# Patient Record
Sex: FEMALE | Race: OTHER | NOT HISPANIC OR LATINO | Employment: OTHER | URBAN - METROPOLITAN AREA
[De-identification: names, ages, dates, MRNs, and addresses within clinical notes are randomized per-mention and may not be internally consistent; named-entity substitution may affect disease eponyms.]

---

## 2022-09-28 ENCOUNTER — OFFICE VISIT (OUTPATIENT)
Dept: OTOLARYNGOLOGY | Facility: CLINIC | Age: 67
End: 2022-09-28
Payer: MEDICARE

## 2022-09-28 DIAGNOSIS — J38.5 LARYNGEAL SPASM: Primary | ICD-10-CM

## 2022-09-28 DIAGNOSIS — J38.3 ADDUCTOR SPASMODIC DYSPHONIA: ICD-10-CM

## 2022-09-28 DIAGNOSIS — R49.0 DYSPHONIA: Primary | ICD-10-CM

## 2022-09-28 DIAGNOSIS — J38.5 LARYNGEAL SPASM: ICD-10-CM

## 2022-09-28 PROCEDURE — 1159F PR MEDICATION LIST DOCUMENTED IN MEDICAL RECORD: ICD-10-PCS | Mod: CPTII,S$GLB,, | Performed by: OTOLARYNGOLOGY

## 2022-09-28 PROCEDURE — 1160F PR REVIEW ALL MEDS BY PRESCRIBER/CLIN PHARMACIST DOCUMENTED: ICD-10-PCS | Mod: CPTII,S$GLB,, | Performed by: OTOLARYNGOLOGY

## 2022-09-28 PROCEDURE — 99204 OFFICE O/P NEW MOD 45 MIN: CPT | Mod: 25,S$GLB,, | Performed by: OTOLARYNGOLOGY

## 2022-09-28 PROCEDURE — 99204 PR OFFICE/OUTPT VISIT, NEW, LEVL IV, 45-59 MIN: ICD-10-PCS | Mod: 25,S$GLB,, | Performed by: OTOLARYNGOLOGY

## 2022-09-28 PROCEDURE — 3288F PR FALLS RISK ASSESSMENT DOCUMENTED: ICD-10-PCS | Mod: CPTII,S$GLB,, | Performed by: OTOLARYNGOLOGY

## 2022-09-28 PROCEDURE — 3288F FALL RISK ASSESSMENT DOCD: CPT | Mod: CPTII,S$GLB,, | Performed by: OTOLARYNGOLOGY

## 2022-09-28 PROCEDURE — 1101F PR PT FALLS ASSESS DOC 0-1 FALLS W/OUT INJ PAST YR: ICD-10-PCS | Mod: CPTII,S$GLB,, | Performed by: OTOLARYNGOLOGY

## 2022-09-28 PROCEDURE — 99999 PR PBB SHADOW E&M-NEW PATIENT-LVL III: CPT | Mod: PBBFAC,,, | Performed by: OTOLARYNGOLOGY

## 2022-09-28 PROCEDURE — 1160F RVW MEDS BY RX/DR IN RCRD: CPT | Mod: CPTII,S$GLB,, | Performed by: OTOLARYNGOLOGY

## 2022-09-28 PROCEDURE — 1101F PT FALLS ASSESS-DOCD LE1/YR: CPT | Mod: CPTII,S$GLB,, | Performed by: OTOLARYNGOLOGY

## 2022-09-28 PROCEDURE — 99999 PR PBB SHADOW E&M-NEW PATIENT-LVL III: ICD-10-PCS | Mod: PBBFAC,,, | Performed by: OTOLARYNGOLOGY

## 2022-09-28 PROCEDURE — 31579 PR LARYNGOSCOPY, FLEX/RIGID TELESCOPIC, W/STROBOSCOPY: ICD-10-PCS | Mod: S$GLB,,, | Performed by: OTOLARYNGOLOGY

## 2022-09-28 PROCEDURE — 31579 LARYNGOSCOPY TELESCOPIC: CPT | Mod: S$GLB,,, | Performed by: OTOLARYNGOLOGY

## 2022-09-28 PROCEDURE — 1159F MED LIST DOCD IN RCRD: CPT | Mod: CPTII,S$GLB,, | Performed by: OTOLARYNGOLOGY

## 2022-09-28 RX ORDER — TRAMADOL HYDROCHLORIDE 50 MG/1
50 TABLET ORAL
COMMUNITY

## 2022-09-28 RX ORDER — TRAZODONE HYDROCHLORIDE 150 MG/1
150 TABLET ORAL
COMMUNITY

## 2022-09-28 RX ORDER — PAROXETINE HYDROCHLORIDE 20 MG/1
TABLET, FILM COATED ORAL
COMMUNITY

## 2022-09-28 RX ORDER — LEVOTHYROXINE SODIUM 50 UG/1
100 TABLET ORAL
COMMUNITY
Start: 2021-11-08

## 2022-09-28 RX ORDER — CHOLECALCIFEROL (VITAMIN D3) 125 MCG
1 CAPSULE ORAL
COMMUNITY

## 2022-09-28 RX ORDER — LIOTHYRONINE SODIUM 5 UG/1
TABLET ORAL
COMMUNITY

## 2022-09-28 RX ORDER — BUSPIRONE HYDROCHLORIDE 15 MG/1
15 TABLET ORAL 2 TIMES DAILY
COMMUNITY
Start: 2022-09-18

## 2022-09-28 RX ORDER — MONTELUKAST SODIUM 10 MG/1
TABLET ORAL
COMMUNITY

## 2022-09-28 RX ORDER — ALBUTEROL SULFATE 90 UG/1
1-2 AEROSOL, METERED RESPIRATORY (INHALATION)
COMMUNITY
Start: 2021-11-11

## 2022-09-28 RX ORDER — OMEPRAZOLE 40 MG/1
40 CAPSULE, DELAYED RELEASE ORAL DAILY
COMMUNITY
Start: 2022-09-18

## 2022-09-28 RX ORDER — OXYBUTYNIN CHLORIDE 5 MG/1
TABLET, EXTENDED RELEASE ORAL
COMMUNITY

## 2022-09-28 RX ORDER — LEVOCETIRIZINE DIHYDROCHLORIDE 5 MG/1
TABLET, FILM COATED ORAL
COMMUNITY

## 2022-09-28 RX ORDER — ALPRAZOLAM 0.5 MG/1
TABLET, ORALLY DISINTEGRATING ORAL
COMMUNITY

## 2022-09-28 RX ORDER — IBUPROFEN 800 MG/1
800 TABLET ORAL EVERY 8 HOURS PRN
COMMUNITY
Start: 2022-07-14

## 2022-09-28 RX ORDER — BUPROPION HYDROCHLORIDE 300 MG/1
300 TABLET ORAL DAILY
COMMUNITY
Start: 2022-09-18

## 2022-09-28 RX ORDER — CELECOXIB 200 MG/1
CAPSULE ORAL
COMMUNITY

## 2022-09-28 RX ORDER — CLONAZEPAM 0.5 MG/1
1-1.5 TABLET ORAL
COMMUNITY

## 2022-09-28 NOTE — LETTER
September 28, 2022        Yrn Beard MD  [address] [place] [address] Drive  [facility] 101  [facility] [address]             [facility]  [address], [address] FLOOR  [facility] [address]  Phone: [phone]  Fax: [phone]   Patient: Bouchra Ro   MR Number: [id number]   YOB: 1955   Date of Visit: 9/28/2022       Dear Dr. Beard:    Thank you for referring Bouchra Ro to me for evaluation. Below are the relevant portions of my assessment and plan of care.            If you have questions, please do not hesitate to call me. I look forward to following Bouchra along with you.    Sincerely,      Finn Lopes MD           CC  Michelle Ross MD

## 2022-09-28 NOTE — LETTER
September 28, 2022        Yrn Beard MD  [address] [place] [address] Drive  [facility] 101  [facility] [address]             [facility]  [address], [address] FLOOR  [facility] [address]  Phone: [phone]  Fax: [phone]   Patient: Bouchra Ro   MR Number: [id number]   YOB: 1955   Date of Visit: 9/28/2022       Dear Dr. Beard:    Thank you for referring Bouchra Ro to me for evaluation. Below are the relevant portions of my assessment and plan of care.            If you have questions, please do not hesitate to call me. I look forward to following Bouchra along with you.    Sincerely,      Finn Lopes MD           CC  Michelle Ross MD

## 2022-09-28 NOTE — LETTER
September 28, 2022        Yrn Beard MD  [address] [place] [address] Drive  [facility] 101  [facility] [address]             [facility]  [address], [address] FLOOR  [facility] [address]  Phone: [phone]  Fax: [phone]   Patient: Bouchra Ro   MR Number: [id number]   YOB: 1955   Date of Visit: 9/28/2022       Dear Dr. Beard:    Thank you for referring Bouchra Ro to me for evaluation. Below are the relevant portions of my assessment and plan of care.            If you have questions, please do not hesitate to call me. I look forward to following Bouchra along with you.    Sincerely,      Finn Lopes MD           CC  Michelle Ross MD

## 2022-09-28 NOTE — PROGRESS NOTES
[facility]  Department of Otorhinolaryngology and Communication Sciences    Bouchra Ro is a 67 y.o. female who presents to the [facility] Center for consultation at the kind request of Dr. Yrn Beard for further management of hoarseness.     She complains of hoarseness. Says she has always been susceptible to laryngitis (ball games, allergies). Had long term COVID in 2021. Hoarse and/off then. Now has been densely hoarse since then. Has to hold her breath to talk. Vocal effort. Onset was gradual. Duration is several months. Time course is constant. Symptoms are stable. She denies any exacerbating factors. She denies any alleviating factors. Associated symptoms include Sore throat/dry throat.     She lives by herself. 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 Lost her  about 7 years ago.    Denies any phonemic preponderance of symptoms. Since COVID she has has been experiencing tremors/spasms involving her hands/fingers/fine motor. Her left hand has a finger that locks up at times. She saw a neurologist Dr. Mickey Chiang in [place] regarding long COVID symptoms, but she has not been investigated for dystonia or spasticity.     Voice Handicap Index Total Score  9/21/2022   Voice Handicap Index Total Score 35     Reflux Symptoms Index Total Score 9/21/2022   Reflux Symptoms Index Total Score 38       Past Medical History  Hyperlipidemia  Depression/anxiety  Hypothyroidism    Past Surgical History  TMJ surgery  Hysterectomy  Knee replacements    Family History  Her family history is not on file.    Social History  She is a nonsmoker    Allergies  She is allergic to codeine, meperidine, penicillins, and morphine.    Medications  She has a current medication list which includes the following prescription(s): albuterol, levothyroxine, alprazolam odt, bupropion, buspirone, celecoxib, clonazepam, ibuprofen, lactase, levocetirizine, liothyronine, montelukast, omeprazole, oxybutynin,  paroxetine, tramadol, and trazodone.    Review of Systems   Constitutional:  Negative for fever.   HENT:  Positive for hearing loss, nosebleeds, sinus pressure, trouble swallowing and voice change. Negative for sore throat.    Eyes:  Positive for itching. Negative for visual disturbance.   Respiratory:  Positive for cough and shortness of breath. Negative for wheezing.    Cardiovascular: Negative.  Negative for chest pain.   Gastrointestinal:  Positive for diarrhea. Negative for nausea.   Endocrine: Negative.    Genitourinary: Negative.    Musculoskeletal:  Negative for arthralgias.   Skin: Negative.  Negative for rash.   Neurological:  Positive for headaches. Negative for tremors.   Hematological: Negative.  Does not bruise/bleed easily.   Psychiatric/Behavioral:  The patient is nervous/anxious.         Objective:     VS reviewed   Physical Exam  Constitutional: comfortable, well dressed  Psychiatric: appropriate affect  Respiratory: comfortably breathing, symmetric chest rise, no stridor  Voice: dense breathy strain/whisper; inconsistent phonatory breaks primarily on voiced phonemes  Cardiovascular: upper extremities non-edematous  Lymphatic: no cervical lymphadenopathy  Neurologic: alert and oriented to time, place, person, and situation; cranial nerves 3-12 grossly intact  Head: normocephalic  Eyes: conjunctivae and sclerae clear  Ears: normal pinnae, normal external auditory canals, tympanic membranes intact  Nose: mucosa pink and noncongested, no masses, no mucopurulence, no polyps  Oral cavity / oropharynx: no mucosal lesions  Neck: soft, full range of motion, laryngotracheal complex palpable with appropriate landmarks, larynx elevates on swallowing  Indirect laryngoscopy: limited due to gag    Procedure  Flexible Laryngeal Videostroboscopy (03794): Laryngeal videostroboscopy is indicated to assess laryngeal vibratory biomechanics and vocal fold oscillation, which cannot be assessed with a plain light  examination. This was carried out transnasally with a distal chip videoendoscope. After verbal consent was obtained, the patient was positioned and the nose was topically decongested with 1% phenylephrine and topically anesthetized with 4% lidocaine. The endoscope was passed through the most patent nasal cavity and positioned to image the nasopharynx, larynx, and hypopharynx in detail. The following features were examined: nasopharyngeal, laryngeal, hypopharyngeal masses; velopharyngeal strength, closure, and symmetry of motion; vocal fold range and symmetry of motion; laryngeal mucosal edema, erythema, inflammation, and hydration; salivary pooling; and gross laryngeal sensation. During phonation, the vocal folds were assessed for glottal closure; mucosal wave; vocal fold lesions; vibratory periodicity, amplitude, and phase symmetry; and vertical height match. The equipment was removed. The patient tolerated the procedure well without complication. All findings were normal except:  - dense posterior glottic hypoadduction without paralysis/paresis  - severe concentric supraglottic hyperfunction with false vocal fold mucosal vibration  - hyperkinetic laryngeal behavior on vegetative respiration  - generalized dyskinesia, with laryngeal dysdiadochokinesia and at times inhalation phonation      Assessment:     Bouchra Ro is a 67 y.o. female with chronic dysphonia. I note generalized laryngopharyngeal hyperkinesia on the spectrum of spasticity versus dystonia, ADSD vs. mixed SD/adductor predominant, with functional overlay.  She also has more generalized dyskinetic symptoms which might trach to a more generalized/segmental dystonia.      The above features might be a consequence of extrapyramidal side effects of psychotropic medications and/or features of long COVID-19.     Plan:        I had a discussion with the patient regarding her condition and the further workup and management options.      I recommended that she  consult with her primary care doctor and a neurologist so as to ascertain whether she might benefit from a change or substitution in her psychotropic medication regimen.    Further treatment to consider would be laryngeal botulinum toxin injection. The risks of laryngeal botulinum toxin chemodenervation were discussed at length with the patient. Risks included but were not limited to pain, bleeding, infection, worsening of voice, worsening of swallowing, dysphagia, aspiration, shortness of breath, noisy breathing, airway obstruction, need for additional injections or procedures, reaction to medication, and development of tolerance to the medication. Our careful and patient-centered approach will try to minimize side effects while also treating the dystonia/spasms/tremors. We emphasized to the patient that sometimes it takes a few injections to determine what dosing strategy works best for each patient. All questions were answered, and the patient wishes to proceed.    Finn Lopes M.D.  [facility]  Department of Otorhinolaryngology and Communication Sciences

## 2022-09-28 NOTE — PATIENT INSTRUCTIONS
Speak with a neurologist and your PCP about whether the hyperkinetic behavior/spasms of your hands/voice/speech may be extrapyramidal effects of some of your depression/anxiety medications        What to expect:  Botox for adductor spasmodic dysphonia    The purpose of the Botox injection into the larynx is to cause a temporary weakening of the vocal fold muscles in order to reduce the voice spasms.  It takes several days to begin working, but after the first few days you will notice a decrease in the spasms.  Every injection may be slightly different, so it is important for you to keep track of your voice and swallowing status on the log sheet provided.  Please return the log each time you come in for an injection.      Basic Botox guidelines  Within 24-72 hours, you should expect your voice to become weak and breathy.  After one week, call into the Voice Center at [phone] and leave a message so our team can hear your voice and  the effectiveness of the injection.   We will not call you back unless you ask us to do so.      After 1-2 weeks, your voice should start to get stronger and have fewer spasms.  The stronger voice typically lasts for about three months, but there is some variability.    You may experience some difficulties with swallowing for the first week after your injection, but these should be short-lived.  Take extra care when drinking thin liquids such as water, coffee, juice, soda, and tea.  If you do find yourself coughing frequently with liquids, try tucking your chin while you swallow, or thickening your liquids.  Thick-It, a liquid thickener, can be purchased at most pharmacies.      Helpful resources:  http://www.dysphonia.org   http://www.aquilino.org  Http://www.entlink.net    If you have any questions, please call our office at [phone], or contact us through the [facility] portal.

## 2022-10-13 ENCOUNTER — TELEPHONE (OUTPATIENT)
Dept: OTOLARYNGOLOGY | Facility: CLINIC | Age: 67
End: 2022-10-13
Payer: MEDICARE

## 2022-10-13 NOTE — TELEPHONE ENCOUNTER
----- Message from Tremontana Chevalier sent at 10/13/2022 10:32 AM CDT -----  Regarding: appt   Pt calling to cxl appt on 10/14 with Finn Lopes because she receved call from Lisseth THEODORE advising that [facility] does not cover her treatment. Pt now wants to reschedule until she can communicate with [facility]. No appts avail to schedule in [facility]. Pls cll pt @ [phone].